# Patient Record
Sex: FEMALE | Race: WHITE | NOT HISPANIC OR LATINO | Employment: OTHER | URBAN - METROPOLITAN AREA
[De-identification: names, ages, dates, MRNs, and addresses within clinical notes are randomized per-mention and may not be internally consistent; named-entity substitution may affect disease eponyms.]

---

## 2017-03-15 ENCOUNTER — RECORDS - HEALTHEAST (OUTPATIENT)
Dept: LAB | Facility: CLINIC | Age: 72
End: 2017-03-15

## 2017-03-15 LAB
CHOLEST SERPL-MCNC: 179 MG/DL
FASTING STATUS PATIENT QL REPORTED: ABNORMAL
HDLC SERPL-MCNC: 50 MG/DL
LDLC SERPL CALC-MCNC: 90 MG/DL
TRIGL SERPL-MCNC: 197 MG/DL

## 2018-01-10 ENCOUNTER — HOSPITAL ENCOUNTER (OUTPATIENT)
Dept: MAMMOGRAPHY | Facility: CLINIC | Age: 73
Discharge: HOME | End: 2018-01-10

## 2018-01-10 DIAGNOSIS — Z12.31 VISIT FOR SCREENING MAMMOGRAM: ICD-10-CM

## 2019-03-13 ENCOUNTER — HOSPITAL ENCOUNTER (OUTPATIENT)
Dept: MAMMOGRAPHY | Facility: CLINIC | Age: 74
Discharge: HOME | End: 2019-03-13

## 2019-03-13 DIAGNOSIS — Z12.31 VISIT FOR SCREENING MAMMOGRAM: ICD-10-CM

## 2020-08-19 ENCOUNTER — COMMUNICATION - HEALTHEAST (OUTPATIENT)
Dept: TELEHEALTH | Facility: CLINIC | Age: 75
End: 2020-08-19

## 2020-08-19 ENCOUNTER — HOSPITAL ENCOUNTER (OUTPATIENT)
Dept: MAMMOGRAPHY | Facility: CLINIC | Age: 75
Discharge: HOME | End: 2020-08-19

## 2020-08-19 DIAGNOSIS — Z12.31 VISIT FOR SCREENING MAMMOGRAM: ICD-10-CM

## 2021-05-25 ENCOUNTER — RECORDS - HEALTHEAST (OUTPATIENT)
Dept: ADMINISTRATIVE | Facility: CLINIC | Age: 76
End: 2021-05-25

## 2021-05-26 ENCOUNTER — RECORDS - HEALTHEAST (OUTPATIENT)
Dept: ADMINISTRATIVE | Facility: CLINIC | Age: 76
End: 2021-05-26

## 2021-05-27 ENCOUNTER — RECORDS - HEALTHEAST (OUTPATIENT)
Dept: ADMINISTRATIVE | Facility: CLINIC | Age: 76
End: 2021-05-27

## 2021-05-28 ENCOUNTER — RECORDS - HEALTHEAST (OUTPATIENT)
Dept: ADMINISTRATIVE | Facility: CLINIC | Age: 76
End: 2021-05-28

## 2021-06-02 ENCOUNTER — RECORDS - HEALTHEAST (OUTPATIENT)
Dept: ADMINISTRATIVE | Facility: CLINIC | Age: 76
End: 2021-06-02

## 2021-07-13 ENCOUNTER — RECORDS - HEALTHEAST (OUTPATIENT)
Dept: ADMINISTRATIVE | Facility: CLINIC | Age: 76
End: 2021-07-13

## 2021-07-21 ENCOUNTER — RECORDS - HEALTHEAST (OUTPATIENT)
Dept: ADMINISTRATIVE | Facility: CLINIC | Age: 76
End: 2021-07-21

## 2021-07-22 ENCOUNTER — RECORDS - HEALTHEAST (OUTPATIENT)
Dept: SCHEDULING | Facility: CLINIC | Age: 76
End: 2021-07-22

## 2021-07-22 DIAGNOSIS — Z12.31 OTHER SCREENING MAMMOGRAM: ICD-10-CM

## 2021-09-16 ENCOUNTER — HOSPITAL ENCOUNTER (EMERGENCY)
Facility: CLINIC | Age: 76
Discharge: HOME | End: 2021-09-16
Attending: EMERGENCY MEDICINE | Admitting: EMERGENCY MEDICINE
Payer: COMMERCIAL

## 2021-09-16 VITALS — BODY MASS INDEX: 28.34 KG/M2 | WEIGHT: 150 LBS

## 2021-09-16 DIAGNOSIS — S81.812A SKIN TEAR OF LOWER LEG WITHOUT COMPLICATION, LEFT, INITIAL ENCOUNTER: ICD-10-CM

## 2021-09-16 PROCEDURE — 99282 EMERGENCY DEPT VISIT SF MDM: CPT

## 2021-09-16 ASSESSMENT — ENCOUNTER SYMPTOMS
COUGH: 0
WOUND: 1
HEADACHES: 0
SHORTNESS OF BREATH: 0
FATIGUE: 0
WEAKNESS: 0
CHILLS: 0
FEVER: 0

## 2021-09-17 NOTE — DISCHARGE INSTRUCTIONS
Keep dressing in place for 24-48 hours  Remove gently to avoid removing scab  Clean 1-2 times daily, keep dressing over top  Return if any signs of infection

## 2021-09-17 NOTE — ED PROVIDER NOTES
EMERGENCY DEPARTMENT ENCOUNTER      NAME: Shira Pierce  AGE: 75 year old female  YOB: 1945  MRN: [id number]  EVALUATION DATE & TIME: 9/16/2021  9:22 PM    PCP: Sea Keller    ED PROVIDER: Shelby Peter DO      Chief Complaint   Patient presents with     Laceration         FINAL IMPRESSION:  1. Skin tear of lower leg without complication, left, initial encounter          ED COURSE & MEDICAL DECISION MAKING:    Pertinent Labs & Imaging studies reviewed. (See chart for details)  75 year old female presents to the Emergency Department for evaluation of a leg laceration.  Patient reports she was going up her neighbor steps, they were uneven and she tripped and fell forward.  She struck her left shin against cement, also hit her face.  No loss conscious.  She is noted a laceration to her left leg that they have difficulty controlling the bleeding.  She is neurovascularly intact.  No mid facial tenderness.  No loss conscious.  She is not on blood thinning medications.  She has a macerated skin tear to her left anterior shin.  Her tetanus is up-to-date.  Nothing to suture unfortunately.  Wound was irrigated, I did inject 5cc 1% lidocaine with epinephrine to help with discomfort.  Surgicel was placed, compression dressing placed by nursing.  Discussed symptomatic care and return precautions.    9:35 PM I met with patient for initial interview and exam. PPE includes surgical mask and gloves.    At the conclusion of the encounter I discussed the results of all of the tests and the disposition. The questions were answered. The patient or family acknowledged understanding and was agreeable with the care plan.     MEDICATIONS GIVEN IN THE EMERGENCY:  Medications   gelatin absorbable (GELFOAM) sponge 1 each (1 each Topical Not Given 9/16/21 2944)   oxidized cellulose (SURGICEL) pad (has no administration in time range)       NEW PRESCRIPTIONS STARTED AT TODAY'S ER VISIT  Discharge Medication  List as of 9/16/2021 10:50 PM             =================================================================    HPI    Patient information was obtained from: patient     Use of : N/A      Shira Pierce is a 75 year old female with a pertinent history of hypertension who presents to this ED by walk in for evaluation of laceration.    Patient has been watching her neighbors house. There were 2 cement blocks as the first step. She thought it was stable but she fell hitting the shin of her left leg and her nose. There is a skin tear and had some epistaxis out of her right krishnamurthy that has resolved. This event happened at 4:00 PM. Denies loss of consciousness and any other complaints.    Last tetanus was 2019.    REVIEW OF SYSTEMS   Review of Systems   Constitutional: Negative for chills, fatigue and fever.   HENT: Positive for nosebleeds (resolved).    Respiratory: Negative for cough and shortness of breath.    Cardiovascular: Negative for chest pain.   Skin: Positive for wound (skin tear to left shin).   Neurological: Negative for syncope, weakness and headaches.        Negative for loss of consciousness.   All other systems reviewed and are negative.       PAST MEDICAL HISTORY:  Past Medical History:   Diagnosis Date     Acute blood loss anemia 11/14/2014     Arthritis      Cancer (H)     squamous cell skin  CA temporal     Depression      Eye disorder     genetic eye disease     GERD (gastroesophageal reflux disease)      Hypertension        PAST SURGICAL HISTORY:  Past Surgical History:   Procedure Laterality Date     APPENDECTOMY       ARTHROPLASTY REVISION HIP Right 2012     ARTHROSCOPY SHOULDER ROTATOR CUFF REPAIR Left 1992     BACK SURGERY  10/9/2014     hemilami L3     BACK SURGERY  April 2014    decompression L4-5     BIOPSY BREAST Left     benign     CHOLECYSTECTOMY       JOINT REPLACEMENT       MAMMOPLASTY REDUCTION       OOPHORECTOMY      bilateral     OTHER SURGICAL HISTORY  2013    right  eyePTK, wears special soft contact lens that can only be removed and changed by opthamologist     OTHER SURGICAL HISTORY  2014    lumbar decompression L4-L5     TOTAL KNEE ARTHROPLASTY Right 2011     TUBAL LIGATION             CURRENT MEDICATIONS:    Conj Estrog-Medroxyprogest Ace (PREMPRO PO)  Hydrocodone-Acetaminophen (VICODIN PO)  Valsartan (DIOVAN PO)         ALLERGIES:  Allergies   Allergen Reactions     Adhesive Tape-Silicones [Adhesive Tape] Other (See Comments)     Tears skin     Atorvastatin Diarrhea     Celebrex [Celecoxib] Diarrhea     GI upset     Codeine Nausea     Gabapentin Other (See Comments)     Lethargy, sedation     Novocain [Procaine] Other (See Comments)     Racing heart, anxiety, nevous, weak     Nsaids (Non-Steroidal Anti-Inflammatory Drug) [Nsaids] Other (See Comments)     GI upset.  Weight loss     Hydrochlorothiazide Rash       FAMILY HISTORY:  Family History   Problem Relation Age of Onset     Breast Cancer Maternal Aunt        SOCIAL HISTORY:   Social History     Socioeconomic History     Marital status:      Spouse name: Not on file     Number of children: Not on file     Years of education: Not on file     Highest education level: Not on file   Occupational History     Not on file   Tobacco Use     Smoking status: Former Smoker     Quit date: 10/8/2012     Years since quittin.9   Substance and Sexual Activity     Alcohol use: No     Drug use: No     Sexual activity: Not on file   Other Topics Concern     Not on file   Social History Narrative     Not on file     Social Determinants of Health     Financial Resource Strain:      Difficulty of Paying Living Expenses:    Food Insecurity:      Worried About Running Out of Food in the Last Year:      Ran Out of Food in the Last Year:    Transportation Needs:      Lack of Transportation (Medical):      Lack of Transportation (Non-Medical):    Physical Activity:      Days of Exercise per Week:      Minutes of Exercise per Session:     Stress:      Feeling of Stress :    Social Connections:      Frequency of Communication with Friends and Family:      Frequency of Social Gatherings with Friends and Family:      Attends [employer] Services:      Active Member of Clubs or Organizations:      Attends Club or Organization Meetings:      Marital Status:    Intimate Partner Violence:      Fear of Current or Ex-Partner:      Emotionally Abused:      Physically Abused:      Sexually Abused:        VITALS:  Wt 68 kg (150 lb)   BMI 28.34 kg/m      PHYSICAL EXAM    Physical Exam  Constitutional:       General: She is not in acute distress.  HENT:      Head: Normocephalic and atraumatic.      Mouth/Throat:      Pharynx: Oropharynx is clear.   Eyes:      Pupils: Pupils are equal, round, and reactive to light.   Cardiovascular:      Rate and Rhythm: Normal rate and regular rhythm.      Pulses: Normal pulses.      Heart sounds: Normal heart sounds.   Pulmonary:      Effort: Pulmonary effort is normal.      Breath sounds: Normal breath sounds.   Abdominal:      General: Abdomen is flat.      Palpations: Abdomen is soft.   Musculoskeletal:         General: Normal range of motion.   Skin:     General: Skin is warm and dry.      Capillary Refill: Capillary refill takes less than 2 seconds.      Comments: 5 cm x 6 cm macerated skin tear to left anterior shin. There is exposure of underlying fat.   Neurological:      General: No focal deficit present.      Mental Status: She is alert and oriented to person, place, and time.      Gait: Gait normal.             I, Gretchen Ruano, am serving as a scribe to document services personally performed by Dr. Shelby Peter based on my observation and the provider's statements to me. IShelby, DO attest that Gretchen Ruano is acting in a scribe capacity, has observed my performance of the services and has documented them in accordance with my direction.    Shelby Peter, DO  Emergency Medicine  [facility]  [facility] EMERGENCY ROOM  [address] [address]  [phone]  Dept: [phone]       Shelby Peter DO  09/16/21 2327

## 2021-09-17 NOTE — ED TRIAGE NOTES
Patient is here with a laceration to her left lower leg. This occurred at 1600 and she has not been able to get the bleeding to stop. She is not on a blood thinner. She hit a cement block.

## 2021-09-27 ENCOUNTER — HOSPITAL ENCOUNTER (EMERGENCY)
Facility: CLINIC | Age: 76
Discharge: HOME | End: 2021-09-27
Attending: EMERGENCY MEDICINE | Admitting: EMERGENCY MEDICINE
Payer: COMMERCIAL

## 2021-09-27 ENCOUNTER — APPOINTMENT (OUTPATIENT)
Dept: RADIOLOGY | Facility: CLINIC | Age: 76
End: 2021-09-27
Attending: EMERGENCY MEDICINE
Payer: COMMERCIAL

## 2021-09-27 VITALS
RESPIRATION RATE: 18 BRPM | OXYGEN SATURATION: 98 % | DIASTOLIC BLOOD PRESSURE: 70 MMHG | HEIGHT: 61 IN | WEIGHT: 140 LBS | HEART RATE: 90 BPM | BODY MASS INDEX: 26.43 KG/M2 | TEMPERATURE: 98 F | SYSTOLIC BLOOD PRESSURE: 138 MMHG

## 2021-09-27 DIAGNOSIS — L03.116 CELLULITIS OF LEFT LOWER EXTREMITY: ICD-10-CM

## 2021-09-27 DIAGNOSIS — S81.802A OPEN WOUND OF LEFT LOWER LEG, INITIAL ENCOUNTER: ICD-10-CM

## 2021-09-27 LAB
ANION GAP SERPL CALCULATED.3IONS-SCNC: 12 MMOL/L (ref 5–18)
BASOPHILS # BLD AUTO: 0.1 10E3/UL (ref 0–0.2)
BASOPHILS NFR BLD AUTO: 1 %
BUN SERPL-MCNC: 12 MG/DL (ref 8–28)
C REACTIVE PROTEIN LHE: 1.3 MG/DL (ref 0–0.8)
CALCIUM SERPL-MCNC: 9.2 MG/DL (ref 8.5–10.5)
CHLORIDE BLD-SCNC: 105 MMOL/L (ref 98–107)
CO2 SERPL-SCNC: 24 MMOL/L (ref 22–31)
CREAT SERPL-MCNC: 0.71 MG/DL (ref 0.6–1.1)
EOSINOPHIL # BLD AUTO: 0.2 10E3/UL (ref 0–0.7)
EOSINOPHIL NFR BLD AUTO: 2 %
ERYTHROCYTE [DISTWIDTH] IN BLOOD BY AUTOMATED COUNT: 12.9 % (ref 10–15)
GFR SERPL CREATININE-BSD FRML MDRD: 84 ML/MIN/1.73M2
GLUCOSE BLD-MCNC: 90 MG/DL (ref 70–125)
HCT VFR BLD AUTO: 41.6 % (ref 35–47)
HGB BLD-MCNC: 13.5 G/DL (ref 11.7–15.7)
IMM GRANULOCYTES # BLD: 0 10E3/UL
IMM GRANULOCYTES NFR BLD: 0 %
LYMPHOCYTES # BLD AUTO: 1.9 10E3/UL (ref 0.8–5.3)
LYMPHOCYTES NFR BLD AUTO: 21 %
MCH RBC QN AUTO: 30.2 PG (ref 26.5–33)
MCHC RBC AUTO-ENTMCNC: 32.5 G/DL (ref 31.5–36.5)
MCV RBC AUTO: 93 FL (ref 78–100)
MONOCYTES # BLD AUTO: 0.7 10E3/UL (ref 0–1.3)
MONOCYTES NFR BLD AUTO: 8 %
NEUTROPHILS # BLD AUTO: 6.1 10E3/UL (ref 1.6–8.3)
NEUTROPHILS NFR BLD AUTO: 68 %
NRBC # BLD AUTO: 0 10E3/UL
NRBC BLD AUTO-RTO: 0 /100
PLATELET # BLD AUTO: 342 10E3/UL (ref 150–450)
POTASSIUM BLD-SCNC: 3.8 MMOL/L (ref 3.5–5)
RBC # BLD AUTO: 4.47 10E6/UL (ref 3.8–5.2)
SODIUM SERPL-SCNC: 141 MMOL/L (ref 136–145)
WBC # BLD AUTO: 9.1 10E3/UL (ref 4–11)

## 2021-09-27 PROCEDURE — 96365 THER/PROPH/DIAG IV INF INIT: CPT

## 2021-09-27 PROCEDURE — 80048 BASIC METABOLIC PNL TOTAL CA: CPT | Performed by: EMERGENCY MEDICINE

## 2021-09-27 PROCEDURE — 99284 EMERGENCY DEPT VISIT MOD MDM: CPT | Mod: 25

## 2021-09-27 PROCEDURE — 36415 COLL VENOUS BLD VENIPUNCTURE: CPT | Performed by: EMERGENCY MEDICINE

## 2021-09-27 PROCEDURE — U0003 INFECTIOUS AGENT DETECTION BY NUCLEIC ACID (DNA OR RNA); SEVERE ACUTE RESPIRATORY SYNDROME CORONAVIRUS 2 (SARS-COV-2) (CORONAVIRUS DISEASE [COVID-19]), AMPLIFIED PROBE TECHNIQUE, MAKING USE OF HIGH THROUGHPUT TECHNOLOGIES AS DESCRIBED BY CMS-2020-01-R: HCPCS | Performed by: EMERGENCY MEDICINE

## 2021-09-27 PROCEDURE — 86141 C-REACTIVE PROTEIN HS: CPT | Performed by: EMERGENCY MEDICINE

## 2021-09-27 PROCEDURE — 250N000011 HC RX IP 250 OP 636: Performed by: EMERGENCY MEDICINE

## 2021-09-27 PROCEDURE — 96368 THER/DIAG CONCURRENT INF: CPT

## 2021-09-27 PROCEDURE — 85004 AUTOMATED DIFF WBC COUNT: CPT | Performed by: EMERGENCY MEDICINE

## 2021-09-27 PROCEDURE — 73590 X-RAY EXAM OF LOWER LEG: CPT | Mod: LT

## 2021-09-27 PROCEDURE — 87070 CULTURE OTHR SPECIMN AEROBIC: CPT | Performed by: EMERGENCY MEDICINE

## 2021-09-27 RX ORDER — CEPHALEXIN 500 MG/1
500 CAPSULE ORAL 4 TIMES DAILY
Qty: 28 CAPSULE | Refills: 0 | Status: SHIPPED | OUTPATIENT
Start: 2021-09-27 | End: 2021-10-04

## 2021-09-27 RX ORDER — CEFTRIAXONE 1 G/1
1 INJECTION, POWDER, FOR SOLUTION INTRAMUSCULAR; INTRAVENOUS ONCE
Status: COMPLETED | OUTPATIENT
Start: 2021-09-27 | End: 2021-09-27

## 2021-09-27 RX ORDER — CLINDAMYCIN HCL 300 MG
300 CAPSULE ORAL 4 TIMES DAILY
Qty: 28 CAPSULE | Refills: 0 | Status: SHIPPED | OUTPATIENT
Start: 2021-09-27 | End: 2021-10-04

## 2021-09-27 RX ORDER — CLINDAMYCIN PHOSPHATE 600 MG/50ML
600 INJECTION, SOLUTION INTRAVENOUS ONCE
Status: COMPLETED | OUTPATIENT
Start: 2021-09-27 | End: 2021-09-27

## 2021-09-27 RX ADMIN — CEFTRIAXONE 1 G: 1 INJECTION, POWDER, FOR SOLUTION INTRAMUSCULAR; INTRAVENOUS at 21:44

## 2021-09-27 RX ADMIN — CLINDAMYCIN PHOSPHATE 600 MG: 600 INJECTION, SOLUTION INTRAVENOUS at 22:22

## 2021-09-27 ASSESSMENT — MIFFLIN-ST. JEOR: SCORE: 1067.42

## 2021-09-27 NOTE — ED TRIAGE NOTES
Patient has a wound that's not healing since 9/16. She's been on abx without improvement. Sent by urgent care for IV abx.

## 2021-09-28 LAB — SARS-COV-2 RNA RESP QL NAA+PROBE: NEGATIVE

## 2021-09-28 NOTE — ED PROVIDER NOTES
EMERGENCY DEPARTMENT ENCOUNTER      NAME: Shira Pierce  AGE: 75 year old female  YOB: 1945  MRN: [id number]  EVALUATION DATE & TIME: 2021  8:47 PM    PCP: Rosalinda Way    ED PROVIDER: Mele Powers M.D.      Chief Complaint   Patient presents with     Wound Check       FINAL IMPRESSION:  1. Open wound of left lower leg, initial encounter    2. Cellulitis of left lower extremity        ED COURSE & MEDICAL DECISION MAKIN:48 PM I met with the patient, obtained history, performed an initial exam, and discussed options and plan for diagnostics and treatment here in the ED.     75 year old female presents to the Emergency Department for evaluation of left lower leg wound.  She had a skin tear seen here about 10 days ago which has now developed into a wound with some purulent drainage.  Has been on Keflex for the last 2 days but has noted some progressed cellulitic change beyond the borders marked from urgent care.  She is afebrile and vitally stable with no constitutional symptoms of infection.  The wound does appear to be purulent so I would be concerned that she likely has a staph infection, potentially MRSA, which is not currently being treated with her Keflex.  Imaging of the wound is available in the media tab for review.  Lab evaluation here looks generally reassuring including normal white blood cell count and minimally elevated CRP.  Discussed options with the patient including admission for continued IV antibiotics.  We settled on a course to initiate antibiotics here with a one-time dose of IV ceftriaxone and clindamycin and will plan to discharge her on a 7-day course of clindamycin as she completes her course of cephalexin as well.  Patient will need to follow-up very closely for a wound check in clinic within the next 1 to 2 days to ensure symptoms are improving.  If she has any other significant worsening symptoms, fever, or other immediate concerns she  understands to have a low threshold to return to the ER for admission and continued IV antibiotics.      MEDICATIONS GIVEN IN THE EMERGENCY:  Medications   clindamycin (CLEOCIN) infusion 600 mg (600 mg Intravenous New Bag 9/27/21 2222)   cefTRIAXone (ROCEPHIN) 1 g vial to attach to  mL bag for ADULTS or NS 50 mL bag for PEDS (1 g Intravenous New Bag 9/27/21 2144)       NEW PRESCRIPTIONS STARTED AT TODAY'S ER VISIT  New Prescriptions    CEPHALEXIN (KEFLEX) 500 MG CAPSULE    Take 1 capsule (500 mg) by mouth 4 times daily for 7 days    CLINDAMYCIN (CLEOCIN) 300 MG CAPSULE    Take 1 capsule (300 mg) by mouth 4 times daily for 7 days          =================================================================    HPI    Patient information was obtained from: Patient    Use of : N/A        Shira Pierce is a 75 year old female with a pertinent history of HTN and cancer who presents to this ED via walk-in for evaluation of wound check.    Per chart review:   Patient was seen at this ED on 9/16/2021 for a fall.   She stepped on an unstable cement block and hit her left shin and nose, did not lose consciousness. Patient sustained a macerated skin tear, which they were unable to suture. Wound was irrigated, Surgicel and compression dressing were both placed. Patient discharged after discussing symptomatic care and return precautions.    Patient was seen at [facility] on 9/21/2021 for an open wound.   Wound appeared to be infected with redness and warmth to surrounding area, patient's wound was marked with skin marker. Patient was started on Keflex for cellulitis, she was advised to take ibuprofen for the pain.    Patient reports that she fell on the 16th (11 days ago), was seen at this ED with a skin tear on her left shin. On the 21st (6 days ago), the wound became swollen, red and infected, even though she has been keeping it clean and dressing it 2-3x per day. Patient was seen at her clinic  and was given Keflex. The wound is very painful. Patient denies a history of diabetes or peripheral vascular disease. She notes that she is generally very active. Patient denies fevers or any other current complaints.      REVIEW OF SYSTEMS   All systems reviewed and negative except as noted in HPI.    PAST MEDICAL HISTORY:  Past Medical History:   Diagnosis Date     Acute blood loss anemia 11/14/2014     Arthritis      Cancer (H)     squamous cell skin  CA temporal     Depression      Eye disorder     genetic eye disease     GERD (gastroesophageal reflux disease)      Hypertension        PAST SURGICAL HISTORY:  Past Surgical History:   Procedure Laterality Date     APPENDECTOMY       ARTHROPLASTY REVISION HIP Right 2012     ARTHROSCOPY SHOULDER ROTATOR CUFF REPAIR Left 1992     BACK SURGERY  10/9/2014     hemilami L3     BACK SURGERY  April 2014    decompression L4-5     BIOPSY BREAST Left     benign     CHOLECYSTECTOMY       JOINT REPLACEMENT       MAMMOPLASTY REDUCTION       OOPHORECTOMY      bilateral     OTHER SURGICAL HISTORY  2013    right eyePTK, wears special soft contact lens that can only be removed and changed by opthamologist     OTHER SURGICAL HISTORY  4/2014    lumbar decompression L4-L5     TOTAL KNEE ARTHROPLASTY Right 2011     TUBAL LIGATION             CURRENT MEDICATIONS:    Current Facility-Administered Medications   Medication     clindamycin (CLEOCIN) infusion 600 mg     Current Outpatient Medications   Medication     cephALEXin (KEFLEX) 500 MG capsule     clindamycin (CLEOCIN) 300 MG capsule     Conj Estrog-Medroxyprogest Ace (PREMPRO PO)     Hydrocodone-Acetaminophen (VICODIN PO)     Valsartan (DIOVAN PO)         ALLERGIES:  Allergies   Allergen Reactions     Adhesive Tape-Silicones [Adhesive Tape] Other (See Comments)     Tears skin     Atorvastatin Diarrhea     Celebrex [Celecoxib] Diarrhea     GI upset     Codeine Nausea     Gabapentin Other (See Comments)     Lethargy, sedation      "Novocain [Procaine] Other (See Comments)     Racing heart, anxiety, nevous, weak     Nsaids (Non-Steroidal Anti-Inflammatory Drug) [Nsaids] Other (See Comments)     GI upset.  Weight loss     Hydrochlorothiazide Rash       FAMILY HISTORY:  Family History   Problem Relation Age of Onset     Breast Cancer Maternal Aunt        SOCIAL HISTORY:   Social History     Socioeconomic History     Marital status:      Spouse name: Not on file     Number of children: Not on file     Years of education: Not on file     Highest education level: Not on file   Occupational History     Not on file   Tobacco Use     Smoking status: Former Smoker     Quit date: 10/8/2012     Years since quittin.9   Substance and Sexual Activity     Alcohol use: No     Drug use: No     Sexual activity: Not on file   Other Topics Concern     Not on file   Social History Narrative     Not on file     Social Determinants of Health     Financial Resource Strain:      Difficulty of Paying Living Expenses:    Food Insecurity:      Worried About Running Out of Food in the Last Year:      Ran Out of Food in the Last Year:    Transportation Needs:      Lack of Transportation (Medical):      Lack of Transportation (Non-Medical):    Physical Activity:      Days of Exercise per Week:      Minutes of Exercise per Session:    Stress:      Feeling of Stress :    Social Connections:      Frequency of Communication with Friends and Family:      Frequency of Social Gatherings with Friends and Family:      Attends Jewish Services:      Active Member of Clubs or Organizations:      Attends Club or Organization Meetings:      Marital Status:    Intimate Partner Violence:      Fear of Current or Ex-Partner:      Emotionally Abused:      Physically Abused:      Sexually Abused:        VITALS:  BP (!) 158/72   Pulse 89   Temp 98.3  F (36.8  C) (Temporal)   Resp 20   Ht 1.549 m (5' 1\")   Wt 63.5 kg (140 lb)   SpO2 98%   BMI 26.45 kg/m      PHYSICAL EXAM  "   Constitutional: Well developed, Well nourished, NAD.  HENT: Normocephalic, Atraumatic. Neck Supple.  Eyes: EOMI, Conjunctiva normal.  Respiratory: Breathing comfortably on room air. Speaks full sentences easily. Lungs clear to ascultation.  Cardiovascular: Normal heart rate, Regular rhythm. No peripheral edema.  Abdomen: Soft  Musculoskeletal: Good range of motion in all major joints. No major deformities noted.   Integument: There is a wound to the left lower shin with surrounding cellulitic change.  There is a small amount of purulent drainage from the wound.  Cellulitis extends a few centimeters outward from the wound in all directions and there is mild edema extending down to the ankle. See media tab.  Neurologic: Alert & awake, Normal motor function, Normal sensory function, No focal deficits noted.   Psychiatric: Cooperative. Affect appropriate.     LAB:  All pertinent labs reviewed and interpreted.  Labs Ordered and Resulted from Time of ED Arrival Up to the Time of Departure from the ED   CRP INFLAMMATION - Abnormal; Notable for the following components:       Result Value    CRP 1.3 (*)     All other components within normal limits   BASIC METABOLIC PANEL - Normal   CBC WITH PLATELETS AND DIFFERENTIAL   COVID-19 VIRUS (CORONAVIRUS) BY PCR   PERIPHERAL IV CATHETER   AEROBIC BACTERIAL CULTURE ROUTINE   CBC WITH PLATELETS & DIFFERENTIAL    Narrative:     The following orders were created for panel order CBC with platelets + differential.  Procedure                               Abnormality         Status                     ---------                               -----------         ------                     CBC with platelets and d...[[id number]]                      Final result                 Please view results for these tests on the individual orders.       RADIOLOGY:  Reviewed all pertinent imaging. Please see official radiology report.  XR Tibia & Fibula Left 2 Views   Final Result   IMPRESSION:  Total left knee arthroplasty. Left tibia and fibula otherwise negative. No fracture. No foreign bodies.            I, Juani Norman, am serving as a scribe to document services personally performed by Dr. Mele Powers, based on my observation and the provider's statements to me. I, Mele Powers MD attest that Juani Norman is acting in a scribe capacity, has observed my performance of the services and has documented them in accordance with my direction.    Mele Powers M.D.  Emergency Medicine  [facility] EMERGENCY ROOM  [address] [address]  [phone]  Dept: [phone]     Mele Powers MD  09/27/21 3043

## 2021-09-28 NOTE — PHARMACY-VANCOMYCIN DOSING SERVICE
Pharmacy Vancomycin Initial Note  Date of Service 2021  Patient's  1945  75 year old, female    Indication: Skin and Soft Tissue Infection    Current estimated CrCl = CrCl cannot be calculated (Patient's most recent lab result is older than the maximum 30 days allowed.).    Creatinine for last 3 days  No results found for requested labs within last 72 hours.    Recent Vancomycin Level(s) for last 3 days  No results found for requested labs within last 72 hours.      Vancomycin IV Administrations (past 72 hours)      No vancomycin orders with administrations in past 72 hours.                Nephrotoxins and other renal medications (From now, onward)    Start     Dose/Rate Route Frequency Ordered Stop    21  vancomycin 1000 mg in 0.9% NaCl 250 mL intermittent infusion 1,000 mg      1,000 mg  over 60 Minutes Intravenous ONCE 21            Contrast Orders - past 72 hours (72h ago, onward)    None              Plan:  1. Start vancomycin  1000 mg IV x1 (15.7 mg/kg)  2. Vancomycin monitoring method: AUC  3. Vancomycin therapeutic monitoring goal: 400-600 mg*h/L   If pharmacy consulted to continue dosing beyond initial dose:   4. Pharmacy will check vancomycin levels as appropriate in 1-3 Days.    5. Serum creatinine levels will be ordered daily for the first week of therapy and at least twice weekly for subsequent weeks.      Milana Li RP

## 2021-09-28 NOTE — DISCHARGE INSTRUCTIONS
You were seen tonight in the [facility] emergency department for a left lower leg wound and infection.  Your labs today look stable.  We think you need to be on an additional antibiotic to better cover you for staph aureus including MRSA.  We gave you a first dose of antibiotics through the IV here and would recommend that you start taking clindamycin first thing in the morning tomorrow and continue this for a total of additional 7 days.  You can also continue your Keflex as previously prescribed.  Please continue to monitor the wound closely.  If you have redness extending much beyond the lines drawn today or you are developing any other concerning symptoms like fever or chills, return right away to the ER.  Would recommend having a wound check with your primary care doctor within the next 24 to 48 hours.

## 2021-09-29 LAB — BACTERIA WND CULT: NO GROWTH

## 2021-10-06 ENCOUNTER — HOSPITAL ENCOUNTER (OUTPATIENT)
Dept: MAMMOGRAPHY | Facility: CLINIC | Age: 76
Discharge: HOME | End: 2021-10-06
Attending: INTERNAL MEDICINE | Admitting: INTERNAL MEDICINE
Payer: COMMERCIAL

## 2021-10-06 DIAGNOSIS — Z12.31 VISIT FOR SCREENING MAMMOGRAM: ICD-10-CM

## 2021-10-06 PROCEDURE — 77067 SCR MAMMO BI INCL CAD: CPT

## 2022-11-01 ENCOUNTER — HOSPITAL ENCOUNTER (OUTPATIENT)
Dept: MAMMOGRAPHY | Facility: CLINIC | Age: 77
Discharge: HOME | End: 2022-11-01
Attending: INTERNAL MEDICINE | Admitting: INTERNAL MEDICINE
Payer: COMMERCIAL

## 2022-11-01 DIAGNOSIS — Z12.31 VISIT FOR SCREENING MAMMOGRAM: ICD-10-CM

## 2022-11-01 PROCEDURE — 77067 SCR MAMMO BI INCL CAD: CPT

## 2022-12-05 ENCOUNTER — LAB REQUISITION (OUTPATIENT)
Dept: LAB | Facility: CLINIC | Age: 77
End: 2022-12-05
Payer: COMMERCIAL

## 2022-12-05 DIAGNOSIS — Z12.4 ENCOUNTER FOR SCREENING FOR MALIGNANT NEOPLASM OF CERVIX: ICD-10-CM

## 2022-12-05 PROCEDURE — G0145 SCR C/V CYTO,THINLAYER,RESCR: HCPCS | Mod: ORL | Performed by: OBSTETRICS & GYNECOLOGY

## 2022-12-05 PROCEDURE — 87624 HPV HI-RISK TYP POOLED RSLT: CPT | Mod: ORL | Performed by: OBSTETRICS & GYNECOLOGY

## 2022-12-08 LAB
BKR LAB AP GYN ADEQUACY: NORMAL
BKR LAB AP GYN INTERPRETATION: NORMAL
BKR LAB AP HPV REFLEX: NORMAL
BKR LAB AP LMP: NORMAL
BKR LAB AP PREVIOUS ABNL DX: NORMAL
BKR LAB AP PREVIOUS ABNORMAL: NORMAL
PATH REPORT.COMMENTS IMP SPEC: NORMAL
PATH REPORT.COMMENTS IMP SPEC: NORMAL
PATH REPORT.RELEVANT HX SPEC: NORMAL

## 2022-12-11 LAB
HUMAN PAPILLOMA VIRUS 16 DNA: NEGATIVE
HUMAN PAPILLOMA VIRUS 18 DNA: NEGATIVE
HUMAN PAPILLOMA VIRUS FINAL DIAGNOSIS: NORMAL
HUMAN PAPILLOMA VIRUS OTHER HR: NEGATIVE

## 2023-02-05 ENCOUNTER — HOSPITAL ENCOUNTER (EMERGENCY)
Facility: CLINIC | Age: 78
Discharge: HOME | End: 2023-02-05
Attending: EMERGENCY MEDICINE | Admitting: EMERGENCY MEDICINE
Payer: COMMERCIAL

## 2023-02-05 VITALS
BODY MASS INDEX: 26.61 KG/M2 | SYSTOLIC BLOOD PRESSURE: 167 MMHG | HEIGHT: 59 IN | HEART RATE: 102 BPM | RESPIRATION RATE: 18 BRPM | OXYGEN SATURATION: 98 % | DIASTOLIC BLOOD PRESSURE: 77 MMHG | TEMPERATURE: 97.9 F | WEIGHT: 132 LBS

## 2023-02-05 DIAGNOSIS — M54.42 ACUTE LEFT-SIDED LOW BACK PAIN WITH LEFT-SIDED SCIATICA: ICD-10-CM

## 2023-02-05 PROBLEM — H18.529 CORNEAL EPITHELIAL BASEMENT MEMBRANE DYSTROPHY: Status: ACTIVE | Noted: 2017-12-27

## 2023-02-05 PROBLEM — I10 ESSENTIAL HYPERTENSION: Status: ACTIVE | Noted: 2017-12-27

## 2023-02-05 PROCEDURE — 99284 EMERGENCY DEPT VISIT MOD MDM: CPT

## 2023-02-05 RX ORDER — PREDNISONE 20 MG/1
TABLET ORAL
Qty: 10 TABLET | Refills: 0 | Status: SHIPPED | OUTPATIENT
Start: 2023-02-05

## 2023-02-05 RX ORDER — ONDANSETRON 4 MG/1
4 TABLET, ORALLY DISINTEGRATING ORAL EVERY 6 HOURS PRN
Qty: 10 TABLET | Refills: 0 | Status: SHIPPED | OUTPATIENT
Start: 2023-02-05

## 2023-02-05 ASSESSMENT — ENCOUNTER SYMPTOMS
WEAKNESS: 1
APPETITE CHANGE: 1
BACK PAIN: 1
DYSURIA: 0
MYALGIAS: 1
NAUSEA: 1
ARTHRALGIAS: 1
SINUS PAIN: 1

## 2023-02-05 NOTE — DISCHARGE INSTRUCTIONS
Please take your first dose of prednisone when you pick it up.  Take following dose tomorrow morning.    You should notice slow improvement of the pain this evening, however it will not completely get rid of it.    Is important reach back out to your spine doctor tomorrow, let them know that you are having increasing left-sided sciatica pain, and they can help with either medical management, physical therapy, or see you in clinic.    If at any point you develop any numbness in your groin, or are unable to empty your bladder, please come back to the emergency department immediately, this is a sign of a dangerous spinal cord impingement.

## 2023-02-05 NOTE — ED TRIAGE NOTES
Pt reports nearly falling when her L ankle gave out on her on 1/25. A few days later, pt began to have severe L low back pain radiating down to her knee. Has had difficulty walking, sleeping, and sitting.      Triage Assessment     Row Name 02/05/23 1126       Triage Assessment (Adult)    Airway WDL WDL       Respiratory WDL    Respiratory WDL WDL       Skin Circulation/Temperature WDL    Skin Circulation/Temperature WDL WDL       Cardiac WDL    Cardiac WDL WDL       Peripheral/Neurovascular WDL    Peripheral Neurovascular WDL WDL       Cognitive/Neuro/Behavioral WDL    Cognitive/Neuro/Behavioral WDL WDL

## 2023-02-05 NOTE — ED PROVIDER NOTES
EMERGENCY DEPARTMENT ENCOUNTER      NAME: Shira Pierce  AGE: 77 year old female  YOB: 1945  MRN: [id number]  EVALUATION DATE & TIME: 2/5/2023 11:31 AM    PCP: Rosalinda Way    ED PROVIDER: Zhen Elias M.D.      Chief Complaint   Patient presents with     Back Pain       FINAL IMPRESSION:  1. Acute left-sided low back pain with left-sided sciatica        ED COURSE & MEDICAL DECISION MAKING:    Pertinent Labs & Imaging studies reviewed. (See chart for details)  ED Course as of 02/05/23 1233   Sun Feb 05, 2023   1213 Patient is a 77-year-old woman who presents with a little over a week of left buttock pain radiating down her leg.  It has migrated slightly more distally over the past couple of days.  No fall, or trauma.  No midline L-spine tenderness that would indicate compression fracture or osteomyelitis.  No indication for MRI or CT scan.  Emergency department.  Symptoms consistent with sciatica.  She has long-term history of low back pain, is seen by [facility].  Is on multiple pain medications including Vicodin.  She does not tolerate gabapentin well.  Discharged with 5 days of prednisone to help with inflammation, recommended follow-up with orthospine tomorrow.     I patient also notes some nonspecific nausea, no vomiting, no abdominal pain or fever.  I considered more dangerous or insidious causes of malaise such as bacteremia, osteomyelitis (had a recent extensive dental procedure), however she is afebrile here, nontoxic appearing.  She is drinking plenty of fluids.  I do not think her somewhat minimal symptoms at this time would warrant lab work-up in the emergency department today. She will be following up with spine surgery regardless. Home with zofran.    Additional ED Course Timestamps:  12:00 PM I met with the patient to gather history and perform an initial exam. PPE: gloves, procedure mask.   12:10 PM We discussed plans for discharge including supportive cares,  symptomatic treatment, outpatient follow up, and reasons to return to the emergency department.      Medical Decision Making    History:    Supplemental history from: N/A    External Record(s) reviewed: Documented in chart, if applicable.    Work Up:    Chart documentation includes differential considered and any EKGs or imaging independently interpreted by provider, where specified.    In additional to work up documented, I considered the following work up: Documented in chart, if applicable.    External consultation:    Discussion of management with another provider: Documented in chart, if applicable    Complicating factors:    Care impacted by chronic illness: Chronic Pain    Care affected by social determinants of health: N/A    Disposition considerations: Discharge. I prescribed additional prescription strength medication(s) as charted. N/A.        At the conclusion of the encounter I discussed the results of all of the tests and the disposition. The questions were answered. The patient or family acknowledged understanding and was agreeable with the care plan.         MEDICATIONS GIVEN IN THE EMERGENCY:  Medications - No data to display      NEW PRESCRIPTIONS STARTED AT TODAY'S ER VISIT  New Prescriptions    ONDANSETRON (ZOFRAN ODT) 4 MG ODT TAB    Take 1 tablet (4 mg) by mouth every 6 hours as needed for nausea    PREDNISONE (DELTASONE) 20 MG TABLET    Take two tablets (= 40mg) each day for 5 (five) days          =================================================================    HPI    Patient information was obtained from: Patient     Use of : N/A      Shira Pierce is a 77 year old female with a pertinent history of degenerative arthritis of hip and left knee, hypertension, who presents to this ED by walk in for evaluation of back pain.    Patient reports lower back and left posterior hip pain after a near fall on ~1/25 (~11 days ago). Patient states she lost her footing, but caught  "herself from falling. However, a few days later (1/29), patient reports lower back pain and left posterior hip pain that has since worsened. Her pain radiates to her groin, down her left buttocks, and down her left thigh to her knee. Patient has tried Vicodin and motrin with no relief. Patient reports difficulty sleeping due to the pain. Patient notes a history of bilateral hip and knee replacements and she is followed by [facility] for her arthritis. Patient notes recent nausea and decreased appetite that is possibly related to her teeth recently being removed (1/17) for dentures and ongoing sinus pain after a sinus infection in 11/2022. Patient additionally reports weakness and general malaise, which she believes is related to her reduced intake. Patient denies any urinary symptoms or any other complaints at this time.         REVIEW OF SYSTEMS   Review of Systems   Constitutional: Positive for appetite change (decreased).        Positive for general malaise   HENT: Positive for sinus pain.    Gastrointestinal: Positive for nausea.   Genitourinary: Negative for dysuria.   Musculoskeletal: Positive for arthralgias (left posterior hip to left knee), back pain (lower) and myalgias (left thigh).   Neurological: Positive for weakness.   All other systems reviewed and are negative.       VITALS:  BP (!) 167/77   Pulse 102   Temp 97.9  F (36.6  C) (Temporal)   Resp 18   Ht 1.499 m (4' 11\")   Wt 59.9 kg (132 lb)   SpO2 98%   BMI 26.66 kg/m      PHYSICAL EXAM    Constitutional: Well developed, well nourished. Comfortable appearing.  HENT: Normocephalic, atraumatic, mucous membranes moist, nose normal. Neck- Supple, gross ROM intact.   Eyes: Pupils mid-range, conjunctiva without injection, no discharge.   Respiratory: Clear to auscultation bilaterally, no respiratory distress, no wheezing, speaks full sentences easily. No cough.  Cardiovascular: Normal heart rate, regular rhythm, no murmurs.   GI: Soft, no " tenderness to deep palpation in all quadrants, no masses.  Musculoskeletal: Moving all 4 extremities intentionally and without pain. No obvious deformity. No midline L spine tenderness. Ambulatory without difficulty.   Skin: Warm, dry, no rash.  Neurologic: Alert & oriented x 3, cranial nerves grossly intact.  Psychiatric: Affect normal, cooperative.        I, Lolita Colleen am serving as a scribe to document services personally performed by Dr. Zhen Elias based on my observation and the provider's statements to me. I, Zhen Elias MD attest that Lolita Macias is acting in a scribe capacity, has observed my performance of the services and has documented them in accordance with my direction.    Zhen Elias M.D.  Emergency Medicine  [facility] EMERGENCY ROOM  [address] [facility] [address]  [phone]  Dept: [phone]       Zhen Elias MD  02/05/23 1748

## 2023-09-04 ENCOUNTER — APPOINTMENT (OUTPATIENT)
Dept: RADIOLOGY | Facility: CLINIC | Age: 78
End: 2023-09-04
Attending: STUDENT IN AN ORGANIZED HEALTH CARE EDUCATION/TRAINING PROGRAM
Payer: COMMERCIAL

## 2023-09-04 ENCOUNTER — HOSPITAL ENCOUNTER (EMERGENCY)
Facility: CLINIC | Age: 78
Discharge: HOME | End: 2023-09-04
Attending: STUDENT IN AN ORGANIZED HEALTH CARE EDUCATION/TRAINING PROGRAM | Admitting: STUDENT IN AN ORGANIZED HEALTH CARE EDUCATION/TRAINING PROGRAM
Payer: COMMERCIAL

## 2023-09-04 VITALS
SYSTOLIC BLOOD PRESSURE: 177 MMHG | OXYGEN SATURATION: 97 % | TEMPERATURE: 98.8 F | HEART RATE: 74 BPM | RESPIRATION RATE: 18 BRPM | DIASTOLIC BLOOD PRESSURE: 77 MMHG

## 2023-09-04 DIAGNOSIS — S01.81XA FACIAL LACERATION, INITIAL ENCOUNTER: ICD-10-CM

## 2023-09-04 DIAGNOSIS — S46.911A STRAIN OF RIGHT SHOULDER, INITIAL ENCOUNTER: ICD-10-CM

## 2023-09-04 PROCEDURE — 12011 RPR F/E/E/N/L/M 2.5 CM/<: CPT

## 2023-09-04 PROCEDURE — 73030 X-RAY EXAM OF SHOULDER: CPT | Mod: RT

## 2023-09-04 PROCEDURE — 73060 X-RAY EXAM OF HUMERUS: CPT | Mod: RT

## 2023-09-04 PROCEDURE — 99284 EMERGENCY DEPT VISIT MOD MDM: CPT | Mod: 25

## 2023-09-04 ASSESSMENT — ACTIVITIES OF DAILY LIVING (ADL): ADLS_ACUITY_SCORE: 35

## 2023-09-04 NOTE — ED PROVIDER NOTES
EMERGENCY DEPARTMENT ENCOUNTER      NAME: Shira Pierce  AGE: 77 year old female  YOB: 1945  MRN: [id number]  EVALUATION DATE & TIME: No admission date for patient encounter.    PCP: Rosalinda Way    ED PROVIDER: Jeovanny Espino MD      Chief Complaint   Patient presents with    Fall    Shoulder Pain         FINAL IMPRESSION:  No diagnosis found.      ED COURSE & MEDICAL DECISION MAKING:    Pertinent Labs & Imaging studies reviewed. (See chart for details)  77 year old female presents to the Emergency Department for evaluation of fall and facial laceration as well as shoulder pain.    Patient is a 77-year-old female history of arthritis, chronic back pain sciatica versus piriformis syndrome presents emergency department after a fall at home.  Patient states she fell asleep in the couch last night woke up right leg felt a bit numb.  She attempted to walk but then fell forward and hit her face on the coffee table.  Denies any loss of consciousness.  Denies any nausea or vomiting since the fall and remembers everything around it.  Denies any neck pain numbness weakness in the arms or legs.  She does endorse some right-sided shoulder pain and says this occurred when she was trying to get up and she feels like she may have injured her rotator cuff.  States she had rotator cuff issues in the past but now the pain is worse.  She is not on any blood thinners.  Denies any back pain chest pain or abdominal pain.  No pain in the lower extremities.    On exam patient has a 2 cm laceration to the right upper lip just approaching the vermilion border.  It is through and through to the internal mucosa of the upper lip as well.  There are some bruising of the related gum which is edentulous as well but no laceration to the gum.  No of other trauma to the head.  No midline C, T or L-spine tenderness.  She does have tenderness palpation over the right shoulder pain is exacerbated with movement but no obvious  deformity.  2+ radial pulse.  Neurovascular intact right upper extremity with intact sensation distributions of median, ulnar and radial nerves.  Strong  strength able to make a thumbs up and okay sign strong abduction of the fingers.    Patient is a 77-year-old female presenting to the emergency department for evaluation after ground-level fall and facial laceration as well as a shoulder pain.  I discussed with the patient and obtaining a CT scan to evaluate for any acute intracranial injury related to her fall.  However, she would like to forego imaging at this point in time.  She is currently awake alert and oriented and has capacity make this decision.  Does not have any vomiting.  No loss of consciousness at the time of the fall and is not on any blood thinners and I think it is reasonable to hold on CT imaging of the head at this point in time as she is at her neurological and behavioral baseline and has signs of isolated trauma to just the lip.  We we will obtain x-rays the right shoulder evaluate for acute traumatic injury or malalignment.    Personally reviewed and interpreted x-rays of the right shoulder which show some chronic degenerative changes but no acute fracture dislocation.  Suspect possible rotator cuff injury as a result of her falling injury earlier today.       6:19 AM I met and evaluated the patient.   7:37 AM I repaired the laceration. Reevaluated and updated the patient with findings. We discussed the plan for discharge and the patient is agreeable. Reviewed supportive cares, symptomatic treatment, outpatient follow up, and reasons to return to the Emergency Department. Patient to be discharged by ED RN.   Medical Decision Making    History:  Supplemental history from: Documented in chart, if applicable  External Record(s) reviewed: Other: [facility]    Work Up:  Chart documentation includes dDocumented in chart, if applicable.ifferential considered and any EKGs or imaging independently  "interpreted by provider, where specified.  In additional to work up documented, I considered the following work up: Documented in chart, if applicable.    External consultation:  Discussion of management with another provider: Documented in chart, if applicable    Complicating factors:  Care impacted by chronic illness: Chronic Pain, Hyperlipidemia, and Hypertension  Care affected by social determinants of health: N/A    Disposition considerations: Discharge. No recommendations on prescription strength medication(s). See documentation for any additional details.       At the conclusion of the encounter I discussed the results of all of the tests and the disposition. The questions were answered. The patient or family acknowledged understanding and was agreeable with the care plan.         MEDICATIONS GIVEN IN THE EMERGENCY:  Medications - No data to display    NEW PRESCRIPTIONS STARTED AT TODAY'S ER VISIT  New Prescriptions    No medications on file          =================================================================    HPI    Patient information was obtained from: patient    Use of : N/A         Shira Pierce is a 77 year old female with a pertinent history of arthritis, chronic back pain, HTN, HLD, who presents to this ED by via walk-in with spouse for evaluation of fall.    Per [facility], her last tetanus was 3/21/2019.    Patient reports at 2:15 AM, she woke up after falling asleep on the couch and got up to use the bathroom. Suddenly, she felt numbness in her entire right leg and the right leg gave out. She proceeded to fall forward and hit her right upper lip against her glass TV stand. She also endorses persistent severe right shoulder pain after the fall. The pain is exacerbated with any movement and radiates down the entire right arm. She also notes she heard a \"popping noise\" from the right shoulder. She denies head trauma or LOC. She is not anticoagulated. She took 1 vicodin with no " relief.    She otherwise denies associating neck pain, nausea, or vomiting. Of note, she has been working with [facility] due to her chronic musculoskeletal pain. There were no other concerns/complaints at this time.      REVIEW OF SYSTEMS   Refer to the HPI    PAST MEDICAL HISTORY:  Past Medical History:   Diagnosis Date    Acute blood loss anemia 11/14/2014    Arthritis     Cancer (H)     squamous cell skin  CA temporal    Depression     Eye disorder     genetic eye disease    GERD (gastroesophageal reflux disease)     Hypertension        PAST SURGICAL HISTORY:  Past Surgical History:   Procedure Laterality Date    APPENDECTOMY      ARTHROPLASTY REVISION HIP Right 2012    ARTHROSCOPY SHOULDER ROTATOR CUFF REPAIR Left 1992    BACK SURGERY  10/9/2014     hemilami L3    BACK SURGERY  April 2014    decompression L4-5    BIOPSY BREAST Left     benign    CHOLECYSTECTOMY      JOINT REPLACEMENT      MAMMOPLASTY REDUCTION      OOPHORECTOMY      bilateral    OTHER SURGICAL HISTORY  2013    right eyePTK, wears special soft contact lens that can only be removed and changed by opthamologist    OTHER SURGICAL HISTORY  4/2014    lumbar decompression L4-L5    TOTAL KNEE ARTHROPLASTY Right 2011    TUBAL LIGATION             CURRENT MEDICATIONS:    Conj Estrog-Medroxyprogest Ace (PREMPRO PO)  Hydrocodone-Acetaminophen (VICODIN PO)  ondansetron (ZOFRAN ODT) 4 MG ODT tab  predniSONE (DELTASONE) 20 MG tablet  Valsartan (DIOVAN PO)        ALLERGIES:  Allergies   Allergen Reactions    Adhesive Tape-Silicones [Adhesive Tape] Other (See Comments)     Tears skin    Atorvastatin Diarrhea    Celebrex [Celecoxib] Diarrhea     GI upset    Codeine Nausea    Gabapentin Other (See Comments)     Lethargy, sedation    Novocain [Procaine] Other (See Comments)     Racing heart, anxiety, nevous, weak    Nsaids (Non-Steroidal Anti-Inflammatory Drug) [Nsaids] Other (See Comments)     GI upset.  Weight loss     Hydrochlorothiazide Rash       FAMILY HISTORY:  Family History   Problem Relation Age of Onset    Breast Cancer Maternal Aunt        SOCIAL HISTORY:   Social History     Socioeconomic History    Marital status:      Spouse name: None    Number of children: None    Years of education: None    Highest education level: None   Tobacco Use    Smoking status: Former     Types: Cigarettes     Quit date: 10/8/2012     Years since quitting: 10.9   Substance and Sexual Activity    Alcohol use: No    Drug use: No       VITALS:  BP (!) 164/69   Pulse 91   Temp 98.8  F (37.1  C) (Temporal)   Resp 18   SpO2 96%     PHYSICAL EXAM    Constitutional: Well developed, Well nourished, NAD,  HENT: Normocephalic, Atraumatic, mucous membranes moist, edentulous. No evidence of head trauma.  Neck- trachea midline, No stridor. No midline c spine tenderness.   Eyes:EOMI, Conjunctiva normal, No discharge.   Respiratory: Normal breath sounds, No respiratory distress, No wheezing, Speaks full sentences easily.    Cardiovascular: Normal heart rate, Regular rhythm,  No murmurs, No rubs, No gallops. Chest wall nontender.    Abdominal: Soft, No tenderness, No rebound or guarding.     Musculoskeletal: 2+ DP pulses. No edema. No cyanosis, Pain to palpation of right shoulder, no deformity, limited ROM secondary to pain, right upper extremities vascularly in tact  Integument: 2 cm laceration to right upper lip through to the inner mucosa in mouth. Warm, Dry, No erythema, No rash.   Neurologic: Alert & oriented x 3   Psychiatric: Affect normal, Judgment normal, Mood normal. Cooperative.      LAB:  All pertinent labs reviewed and interpreted.  Results for orders placed or performed during the hospital encounter of 09/04/23   XR Shoulder Right G/E 3 Views    Impression    IMPRESSION: No acute right shoulder or right humerus fracture or dislocation. There is mild to moderate acromioclavicular degenerative arthrosis. Small foci of tenderness  mineralization are noted adjacent to the right humerus greater tuberosity. Although   often incidental and asymptomatic, this can be evidence of calcific tendinitis in the correct clinical setting.   XR Humerus Port Right G/E 2 Views    Impression    IMPRESSION: No acute right shoulder or right humerus fracture or dislocation. There is mild to moderate acromioclavicular degenerative arthrosis. Small foci of tenderness mineralization are noted adjacent to the right humerus greater tuberosity. Although   often incidental and asymptomatic, this can be evidence of calcific tendinitis in the correct clinical setting.       RADIOLOGY:  Reviewed all pertinent imaging. Please see official radiology report.  XR Humerus Port Right G/E 2 Views   Final Result   IMPRESSION: No acute right shoulder or right humerus fracture or dislocation. There is mild to moderate acromioclavicular degenerative arthrosis. Small foci of tenderness mineralization are noted adjacent to the right humerus greater tuberosity. Although    often incidental and asymptomatic, this can be evidence of calcific tendinitis in the correct clinical setting.      XR Shoulder Right G/E 3 Views   Final Result   IMPRESSION: No acute right shoulder or right humerus fracture or dislocation. There is mild to moderate acromioclavicular degenerative arthrosis. Small foci of tenderness mineralization are noted adjacent to the right humerus greater tuberosity. Although    often incidental and asymptomatic, this can be evidence of calcific tendinitis in the correct clinical setting.          EKG:      PROCEDURES:     PROCEDURE: Laceration Repair   INDICATIONS: Laceration   PROCEDURE PROVIDER: Dr Jeovanny Espino   SITE: Right upper lip   TYPE/SIZE: complex, clean, and no foreign body visualized  2 cm (total length)   FUNCTIONAL ASSESSMENT: Distal sensation, circulation, and motor intact   MEDICATION: 2 mLs of 1% lidocaine was used to perform a inferior orbital nerve block    PREPARATION: irrigation with Normal saline   DEBRIDEMENT: no debridement   CLOSURE:  Superficial layer closed with 3 stitches of 5-0 Fast Absorbing simple interrupted     The internal mucosa was repaired with 2 simple interrupted sutures using 5-0 chromic gut.     Total number of sutures/staples placed: 5 in all, 3 on external skin and 2 on internal mucosa of the mouth         [facility] Documentation:   CMS Diagnoses:              I, Melissa Collier, am serving as a scribe to document services personally performed by Jeovanny Espino MD based on my observation and the provider's statements to me. I, Jeovanny Espino MD, attest that Melissa Collier is acting in a scribe capacity, has observed my performance of the services and has documented them in accordance with my direction.    Jeovanny Espino MD  [facility] EMERGENCY ROOM  [address] [place] [id number]  [phone]      Jeovanny Espino MD  09/04/23 0818

## 2023-09-04 NOTE — DISCHARGE INSTRUCTIONS
No signs of a broken bone on your x-ray today however it is possible that you injured your rotator cuff or otherwise sprained/strained your shoulder.  I recommend you follow-up with your orthopedic doctor.  You have been given a sling for a few days for comfort but you should practice limited range of ability to make sure your shoulder does not stiffen up.  Stitches are absorbable and may fall out on their own about 5 to 7 days however if they are still in place after 7 days recommend you follow-up with either your primary care doctor or return to the emergency department to have stitches removed.

## 2023-09-04 NOTE — ED NOTES
Patient discharged home with spouse driving. Sling in place to right arm. Discharge AVS and follow-up care discussed with patient and spouse. All questions answered. ...Anita Villar RN

## 2023-11-22 ENCOUNTER — HOSPITAL ENCOUNTER (EMERGENCY)
Facility: CLINIC | Age: 78
Discharge: HOME | End: 2023-11-22
Admitting: PHYSICIAN ASSISTANT
Payer: COMMERCIAL

## 2023-11-22 VITALS
DIASTOLIC BLOOD PRESSURE: 87 MMHG | OXYGEN SATURATION: 96 % | TEMPERATURE: 98.9 F | HEIGHT: 60 IN | BODY MASS INDEX: 24.74 KG/M2 | WEIGHT: 126 LBS | SYSTOLIC BLOOD PRESSURE: 183 MMHG | HEART RATE: 100 BPM | RESPIRATION RATE: 16 BRPM

## 2023-11-22 DIAGNOSIS — S81.812A LACERATION OF SKIN OF LEFT LOWER LEG, INITIAL ENCOUNTER: ICD-10-CM

## 2023-11-22 PROBLEM — E78.2 MIXED HYPERLIPIDEMIA: Status: ACTIVE | Noted: 2023-06-10

## 2023-11-22 PROCEDURE — 12002 RPR S/N/AX/GEN/TRNK2.6-7.5CM: CPT

## 2023-11-22 PROCEDURE — 99283 EMERGENCY DEPT VISIT LOW MDM: CPT

## 2023-11-22 RX ORDER — CEPHALEXIN 500 MG/1
500 CAPSULE ORAL 3 TIMES DAILY
Qty: 15 CAPSULE | Refills: 0 | Status: SHIPPED | OUTPATIENT
Start: 2023-11-22 | End: 2023-11-27

## 2023-11-22 RX ORDER — CEPHALEXIN 500 MG/1
500 CAPSULE ORAL ONCE
Status: DISCONTINUED | OUTPATIENT
Start: 2023-11-22 | End: 2023-11-22 | Stop reason: HOSPADM

## 2023-11-22 NOTE — ED TRIAGE NOTES
The patient presents to the ED with a laceration to the left lower leg that occurred today when it was bumped into a sharp wood table. The patient reports the area is a large open flap. The bleeding is controlled at this time, wound covered with a dressing and tape. The patient is unsure when her last tetanus vaccination was. She is not on any blood thinning medications. Ambulatory in triage.

## 2023-11-22 NOTE — ED PROVIDER NOTES
ED PROVIDER NOTE    EMERGENCY DEPARTMENT ENCOUNTER      NAME: Shira Pierce  AGE: 78 year old female  YOB: 1945  MRN: [id number]  EVALUATION DATE & TIME: No admission date for patient encounter.    PCP: Rosalinda Way    ED PROVIDER: Sandra Foss PA-C      Chief Complaint   Patient presents with    Laceration         FINAL IMPRESSION:  1. Laceration of skin of left lower leg, initial encounter          MEDICAL DECISION MAKING:    Pertinent Labs & Imaging studies reviewed. (See chart for details)      ED Course as of 11/23/23 0029   Wed Nov 22, 2023   1631 78-year-old female who is not anticoagulated presenting to the emergency department with a large laceration on her left shin from the edge of a table leaf.  She is neurovascularly intact.  Has been able to ambulate without difficulty.  On examination no signs of muscle or tendon involvement.  Tetanus is up-to-date.  No bony injury.  Considered x-rays but I do not think those are necessary today given examination and mechanism of injury.       1820 11 sutures were placed in the patient's skin tear as well as several Steri-Strips.  We will put her on Keflex given the wound still has some open edges and previous wound in the area became infected after similar injury.    Discussed wound care at home as well as very close return precautions and recommended follow-up with her PCP or the wound specialists at her dermatology clinic in 3 to 4 days.       At the conclusion of the encounter I discussed the results of all of the tests and the disposition. The questions were answered. The patient or family acknowledged understanding and was agreeable with the care plan.       Medical Decision Making    History:  Supplemental history from: Documented in chart, if applicable and Family Member/Significant Other  External Record(s) reviewed: Documented in chart, if applicable., Outpatient Record: [facility], and Other: 11/22/23 Pre-op Visit    Work Up:  Chart  documentation includes differential considered and any EKGs or imaging independently interpreted by provider, where specified.  In additional to work up documented, I considered the following work up: Documented in chart, if applicable.    External consultation:  Discussion of management with another provider: Documented in chart, if applicable    Complicating factors:  Care impacted by chronic illness: Chronic Pain and Hypertension  Care affected by social determinants of health: N/A    Disposition considerations: Discharge. I prescribed additional prescription strength medication(s) as charted. See documentation for any additional details.          ED COURSE  4:21 PM Due to a shortage of available emergency department rooms, with the patient's permission I met with the patient for the initial interview and physical examination in a triage room. Discussed plan for treatment and workup in the ED.     5:00 PM Performed laceration repair.   5:57 PM I rechecked and updated the patient, finished her lac repair. I discussed the plan for discharge with the patient, and patient is agreeable. We discussed supportive cares at home and reasons for return to the ER including new or worsening symptoms - all questions and concerns addressed. Patient to be discharged by RN.       MEDICATIONS GIVEN IN THE EMERGENCY:  Medications   cephALEXin (KEFLEX) capsule 500 mg (has no administration in time range)       NEW PRESCRIPTIONS STARTED AT TODAY'S ER VISIT  New Prescriptions    CEPHALEXIN (KEFLEX) 500 MG CAPSULE    Take 1 capsule (500 mg) by mouth 3 times daily for 5 days          =================================================================    HPI    Patient information was obtained from: the patient       Shira Pierce is a 78 year old female with a h/o chronic pain (on hydrocodone/acetaminophen daily), HTN, hip arthritis who presents to the ED via walk in for evaluation of a laceration.     The patient reports that she  bumped into the corner of table leaf that was standing upright this afternoon, causing it to fall onto her left shin. This tore her skin on her left shin, prompting presentation to the ED. She endorses burning pain to her left shin, but is able to walk without difficulty. She is not taking a blood thinner at this time. The patient denies numbness, tingling, and any other symptoms or complaints at this time.     Reviewed prior immunizations: Last Tdap was 2019    Reviewed outside records:  11/22/23 Pre-op visit with Internal medicine for shoulder pain    REVIEW OF SYSTEMS   See HPI, otherwise all other systems reviewed and are negative    PAST MEDICAL HISTORY:  Past Medical History:   Diagnosis Date    Acute blood loss anemia 11/14/2014    Arthritis     Cancer (H)     squamous cell skin  CA temporal    Depression     Eye disorder     genetic eye disease    GERD (gastroesophageal reflux disease)     Hypertension        PAST SURGICAL HISTORY:  Past Surgical History:   Procedure Laterality Date    APPENDECTOMY      ARTHROPLASTY REVISION HIP Right 2012    ARTHROSCOPY SHOULDER ROTATOR CUFF REPAIR Left 1992    BACK SURGERY  10/9/2014     hemilami L3    BACK SURGERY  April 2014    decompression L4-5    BIOPSY BREAST Left     benign    CHOLECYSTECTOMY      JOINT REPLACEMENT      MAMMOPLASTY REDUCTION      OOPHORECTOMY      bilateral    OTHER SURGICAL HISTORY  2013    right eyePTK, wears special soft contact lens that can only be removed and changed by opthamologist    OTHER SURGICAL HISTORY  4/2014    lumbar decompression L4-L5    TOTAL KNEE ARTHROPLASTY Right 2011    TUBAL LIGATION             CURRENT MEDICATIONS:      Current Facility-Administered Medications:     cephALEXin (KEFLEX) capsule 500 mg, 500 mg, Oral, Once, Sandra Foss PA-C    Current Outpatient Medications:     cephALEXin (KEFLEX) 500 MG capsule, Take 1 capsule (500 mg) by mouth 3 times daily for 5 days, Disp: 15 capsule, Rfl: 0    Conj  Estrog-Medroxyprogest Ace (PREMPRO PO), , Disp: , Rfl:     Hydrocodone-Acetaminophen (VICODIN PO), , Disp: , Rfl:     ondansetron (ZOFRAN ODT) 4 MG ODT tab, Take 1 tablet (4 mg) by mouth every 6 hours as needed for nausea, Disp: 10 tablet, Rfl: 0    predniSONE (DELTASONE) 20 MG tablet, Take two tablets (= 40mg) each day for 5 (five) days, Disp: 10 tablet, Rfl: 0    Valsartan (DIOVAN PO), , Disp: , Rfl:     ALLERGIES:  Allergies   Allergen Reactions    Adhesive Tape-Silicones [Adhesive Tape] Other (See Comments)     Tears skin    Atorvastatin Diarrhea    Celebrex [Celecoxib] Diarrhea     GI upset    Codeine Nausea    Gabapentin Other (See Comments)     Lethargy, sedation    Novocain [Procaine] Other (See Comments)     Racing heart, anxiety, nevous, weak    Nsaids (Non-Steroidal Anti-Inflammatory Drug) [Nsaids] Other (See Comments)     GI upset.  Weight loss    Hydrochlorothiazide Rash       FAMILY HISTORY:  Family History   Problem Relation Age of Onset    Breast Cancer Maternal Aunt          VITALS:  Vitals:    11/22/23 1552   BP: (!) 183/87   Pulse: 100   Resp: 16   Temp: 98.9  F (37.2  C)   TempSrc: Temporal   SpO2: 96%   Weight: 57.2 kg (126 lb)   Height: 1.524 m (5')       PHYSICAL EXAM    General Appearance:  Alert, cooperative, no distress, appears stated age  HENT: Normocephalic without obvious deformity, atraumatic. Mucous membranes moist   Eyes: Conjunctiva clear, Lids normal. No discharge.   Respiratory: No distress.   Musculoskeletal: Large flap laceration over the left shin measuring 7 x 5 cm.  No muscle involvement. No bony tenderness. Walks w/o difficulty. 2+ DP pulse. Normal sensation in leg. Normal strength and ROM of the ankle  Integument: Laceration as above.  Neurologic: Alert and orientated x3. No focal deficits.  Psych: Normal mood and affect        LAB:  Labs Ordered and Resulted from Time of ED Arrival to Time of ED Departure - No data to display    RADIOLOGY:  No orders to display       EKG:     None    PROCEDURES:     PROCEDURE: Laceration Repair   INDICATIONS: Laceration   PROCEDURE PROVIDER: Sandra Foss PA-C   SITE: Left shin   TYPE/SIZE: simple, complex, ragged edges, and no foreign body visualized  7x5 cm (total length)   FUNCTIONAL ASSESSMENT: Distal sensation, circulation, and motor intact   MEDICATION: 4 mLs of 1% Lidocaine with epinephrine   PREPARATION: scrubbing and irrigation with Normal saline   DEBRIDEMENT: minimal debridement   CLOSURE:  Superficial layer closed with 11 stitches of 5-0 Ethilon simple interrupted and multiple steri strips    Total number of sutures/staples placed: 11               I, Linda Butcher, am serving as a scribe to document services personally performed by Sandra Foss PA-C based on my observation and the provider's statements to me. I, Sandra Foss PA-C attest that Linda Butcher is acting in a scribe capacity, has observed my performance of the services and has documented them in accordance with my direction.    Sandra Foss PA-C   Emergency Medicine             Sandra Foss PA-C  11/23/23 0032

## 2023-11-23 NOTE — DISCHARGE INSTRUCTIONS
We have cleaned and repaired your laceration but this needs very good wound management and monitoring.  We placed 11 sutures that will need to be removed in approximately 10 to 14 days but would like you to be seen in the next 2 to 4 days for a wound recheck.  We are covering you with an antibiotic to help prevent any infection especially given your history of prior infection.    Avoid getting wet for the next 48 hours.  After that you can do a soft dabbing of the wound with a clean wet washcloth but I would avoid soaking it until the skin has really healed over and you have a repeat wound check.  Schedule a follow-up with either your dermatology wound specialist or your PCP in the next 3 to 4 days for a wound recheck.  In the meantime keep the leg elevated is much as you can, monitor the wound and change the dressing daily.  If you notice any increased pain, redness, drainage or swelling, return to the ER.

## 2025-01-08 ENCOUNTER — LAB REQUISITION (OUTPATIENT)
Dept: LAB | Facility: CLINIC | Age: 80
End: 2025-01-08

## 2025-01-08 DIAGNOSIS — I10 ESSENTIAL (PRIMARY) HYPERTENSION: ICD-10-CM

## 2025-01-08 DIAGNOSIS — E78.5 HYPERLIPIDEMIA, UNSPECIFIED: ICD-10-CM

## 2025-01-08 LAB
ALBUMIN SERPL BCG-MCNC: 4.1 G/DL (ref 3.5–5.2)
ALP SERPL-CCNC: 71 U/L (ref 40–150)
ALT SERPL W P-5'-P-CCNC: 10 U/L (ref 0–50)
ANION GAP SERPL CALCULATED.3IONS-SCNC: 11 MMOL/L (ref 7–15)
AST SERPL W P-5'-P-CCNC: 19 U/L (ref 0–45)
BILIRUB SERPL-MCNC: 0.3 MG/DL
BUN SERPL-MCNC: 12.2 MG/DL (ref 8–23)
CALCIUM SERPL-MCNC: 9.3 MG/DL (ref 8.8–10.4)
CHLORIDE SERPL-SCNC: 104 MMOL/L (ref 98–107)
CHOLEST SERPL-MCNC: 173 MG/DL
CREAT SERPL-MCNC: 0.75 MG/DL (ref 0.51–0.95)
EGFRCR SERPLBLD CKD-EPI 2021: 81 ML/MIN/1.73M2
FASTING STATUS PATIENT QL REPORTED: ABNORMAL
FASTING STATUS PATIENT QL REPORTED: NORMAL
GLUCOSE SERPL-MCNC: 99 MG/DL (ref 70–99)
HCO3 SERPL-SCNC: 24 MMOL/L (ref 22–29)
HDLC SERPL-MCNC: 60 MG/DL
LDLC SERPL CALC-MCNC: 88 MG/DL
NONHDLC SERPL-MCNC: 113 MG/DL
POTASSIUM SERPL-SCNC: 5 MMOL/L (ref 3.4–5.3)
PROT SERPL-MCNC: 6.3 G/DL (ref 6.4–8.3)
SODIUM SERPL-SCNC: 139 MMOL/L (ref 135–145)
TRIGL SERPL-MCNC: 124 MG/DL

## 2025-01-08 PROCEDURE — 80061 LIPID PANEL: CPT | Performed by: STUDENT IN AN ORGANIZED HEALTH CARE EDUCATION/TRAINING PROGRAM

## 2025-01-08 PROCEDURE — 82435 ASSAY OF BLOOD CHLORIDE: CPT | Performed by: STUDENT IN AN ORGANIZED HEALTH CARE EDUCATION/TRAINING PROGRAM

## 2025-01-08 PROCEDURE — 84155 ASSAY OF PROTEIN SERUM: CPT | Performed by: STUDENT IN AN ORGANIZED HEALTH CARE EDUCATION/TRAINING PROGRAM

## 2025-04-27 NOTE — ED TRIAGE NOTES
Pt had a fall around 0200. She fell asleep on the couch, and attempted to walk back to her bed but fell on her tv stand. Pt hit her face, currently has a laceration on her left side of her face above her lip. She does have a history of weakness in her right arm/shoulder, pt thinks she pulled something since she tried to get up after falling. Pt denies LOC and blood thinners.      Triage Assessment       Row Name 09/04/23 0612       Triage Assessment (Adult)    Airway WDL WDL       Respiratory WDL    Respiratory WDL WDL       Skin Circulation/Temperature WDL    Skin Circulation/Temperature WDL WDL       Cardiac WDL    Cardiac WDL WDL       Peripheral/Neurovascular WDL    Peripheral Neurovascular WDL WDL                    
BACK PAIN/PAIN

## 2025-05-28 ENCOUNTER — HOSPITAL ENCOUNTER (OUTPATIENT)
Dept: MAMMOGRAPHY | Facility: CLINIC | Age: 80
Discharge: HOME | End: 2025-05-28
Attending: OBSTETRICS & GYNECOLOGY
Payer: COMMERCIAL

## 2025-05-28 DIAGNOSIS — Z12.31 VISIT FOR SCREENING MAMMOGRAM: ICD-10-CM

## 2025-05-28 PROCEDURE — 77067 SCR MAMMO BI INCL CAD: CPT

## 2025-07-25 ENCOUNTER — LAB REQUISITION (OUTPATIENT)
Dept: LAB | Facility: CLINIC | Age: 80
End: 2025-07-25

## 2025-07-25 DIAGNOSIS — I10 ESSENTIAL (PRIMARY) HYPERTENSION: ICD-10-CM

## 2025-07-25 PROCEDURE — 80053 COMPREHEN METABOLIC PANEL: CPT | Performed by: STUDENT IN AN ORGANIZED HEALTH CARE EDUCATION/TRAINING PROGRAM

## 2025-07-26 LAB
ALBUMIN SERPL BCG-MCNC: 4.2 G/DL (ref 3.5–5.2)
ALP SERPL-CCNC: 67 U/L (ref 40–150)
ALT SERPL W P-5'-P-CCNC: 10 U/L (ref 0–50)
ANION GAP SERPL CALCULATED.3IONS-SCNC: 12 MMOL/L (ref 7–15)
AST SERPL W P-5'-P-CCNC: 21 U/L (ref 0–45)
BILIRUB SERPL-MCNC: 0.2 MG/DL
BUN SERPL-MCNC: 13.9 MG/DL (ref 8–23)
CALCIUM SERPL-MCNC: 9.3 MG/DL (ref 8.8–10.4)
CHLORIDE SERPL-SCNC: 102 MMOL/L (ref 98–107)
CREAT SERPL-MCNC: 0.82 MG/DL (ref 0.51–0.95)
EGFRCR SERPLBLD CKD-EPI 2021: 72 ML/MIN/1.73M2
GLUCOSE SERPL-MCNC: 127 MG/DL (ref 70–99)
HCO3 SERPL-SCNC: 26 MMOL/L (ref 22–29)
POTASSIUM SERPL-SCNC: 4.4 MMOL/L (ref 3.4–5.3)
PROT SERPL-MCNC: 6.5 G/DL (ref 6.4–8.3)
SODIUM SERPL-SCNC: 140 MMOL/L (ref 135–145)